# Patient Record
Sex: FEMALE | Race: ASIAN
[De-identification: names, ages, dates, MRNs, and addresses within clinical notes are randomized per-mention and may not be internally consistent; named-entity substitution may affect disease eponyms.]

---

## 2024-05-09 ENCOUNTER — APPOINTMENT (OUTPATIENT)
Dept: PODIATRY | Facility: CLINIC | Age: 44
End: 2024-05-09
Payer: MEDICAID

## 2024-05-09 DIAGNOSIS — Z86.39 PERSONAL HISTORY OF OTHER ENDOCRINE, NUTRITIONAL AND METABOLIC DISEASE: ICD-10-CM

## 2024-05-09 DIAGNOSIS — M79.672 PAIN IN LEFT FOOT: ICD-10-CM

## 2024-05-09 DIAGNOSIS — M72.2 PLANTAR FASCIAL FIBROMATOSIS: ICD-10-CM

## 2024-05-09 DIAGNOSIS — M77.32 CALCANEAL SPUR, LEFT FOOT: ICD-10-CM

## 2024-05-09 DIAGNOSIS — Z82.49 FAMILY HISTORY OF ISCHEMIC HEART DISEASE AND OTHER DISEASES OF THE CIRCULATORY SYSTEM: ICD-10-CM

## 2024-05-09 DIAGNOSIS — Z83.438 FAMILY HISTORY OF OTHER DISORDER OF LIPOPROTEIN METABOLISM AND OTHER LIPIDEMIA: ICD-10-CM

## 2024-05-09 PROBLEM — Z00.00 ENCOUNTER FOR PREVENTIVE HEALTH EXAMINATION: Status: ACTIVE | Noted: 2024-05-09

## 2024-05-09 PROCEDURE — 99203 OFFICE O/P NEW LOW 30 MIN: CPT | Mod: 25

## 2024-05-09 PROCEDURE — 20550 NJX 1 TENDON SHEATH/LIGAMENT: CPT | Mod: LT

## 2024-05-13 PROBLEM — M79.672 LEFT FOOT PAIN: Status: ACTIVE | Noted: 2024-05-10

## 2024-05-13 PROBLEM — M77.32 CALCANEAL SPUR OF LEFT FOOT: Status: ACTIVE | Noted: 2024-05-10

## 2024-05-13 PROBLEM — M72.2 PLANTAR FASCIITIS: Status: ACTIVE | Noted: 2024-05-10

## 2024-05-13 NOTE — ASSESSMENT
[FreeTextEntry1] : Impression: Plantar fasciitis. Heel spur syndrome. Pain.  Treatment: She has post-static dyskinesia and pain consistent with heel spur syndrome. She has been wearing arch supports that help. I recommended much more supportive and stability type sneaker, [employer]oka Bondi-8 which I want her to purchase today. I gave her a hand-out of home therapy exercises and stretching, which I think will be very beneficial. Patient was placed in the treatment chair in supine position. After obtaining verbal consent time out was performed to the identified area of maximal tenderness. The foot was prepped utilizing 70% alcohol solution. After utilizing Ethyl Chloride, I gave a one-time local injection consisting of 1 1/2cc's of a combination of Xylocaine and Kenalog-40 was infiltrated at the site of maximum tenderness over the plantar medial aspect of the left heel with immediate relief noted upon weight bearing. Injection site was covered with band-aid. Patient was advised on the potential of a steroid flare following an injection, advised to apply ice to the area. Patient was instructed on the potential of a fascial rupture, advised to avoid high impact physical activities. I put a strapping on that she will leave on until tomorrow. She will work on the stretching exercises and get new shoes. I gave her a prescription for physical therapy. With continued pain that persists after 6 weeks, she will follow-up in the office.

## 2024-05-13 NOTE — PHYSICAL EXAM
[2+] : left foot dorsalis pedis 2+ [Sensation] : the sensory exam was normal to light touch and pinprick [No Focal Deficits] : no focal deficits [Deep Tendon Reflexes (DTR)] : deep tendon reflexes were 2+ and symmetric [Motor Exam] : the motor exam was normal [Ankle Swelling (On Exam)] : not present [Varicose Veins Of Lower Extremities] : not present [] : not present [Delayed in the Right Toes] : capillary refills normal in right toes [Delayed in the Left Toes] : capillary refills normal in the left toes [FreeTextEntry3] : Hair growth noted on digits. Proximal to distal cooling is within normal limits.  [de-identified] : She has good stability. No pain at the ankle ligaments or rearfoot ligaments. The tendons are noted to be intact without symptomatology. She has medial band insertional plantar fasciitis at the medial insertion. There is no pain in the deep arch. It is mostly at the insertion like a typical plantar fasciitis and heel spur syndrome. Previous x-rays demonstrate heel spur.  Negative Skip's sign and negative signs of DVT. She complains of some numbness but there are no signs of sciatica and no signs of tarsal tunnel with palpation. [FreeTextEntry1] : There is no real swelling noted. The patient has no mottling, discoloration or trophic changes.  [Diminished Throughout Right Foot] : normal sensation with monofilament testing throughout right foot [Diminished Throughout Left Foot] : normal sensation with monofilament testing throughout left foot

## 2024-05-13 NOTE — HISTORY OF PRESENT ILLNESS
[FreeTextEntry1] : Patient presents today because of continued pain at the left foot that has been going on for at least 2 months. She has been rubbing topical medicines on it and she still complains of pain. She has occasional numbness. She complains of post-static dyskinesia and the pain is about a 7/10. She notes only slight improvement recently. She has just been in the CAM boot for the last 3 to 4 weeks but does not feel comfortable in it and does not want to wear it anymore. She has a history of walking and slipping in a puddle and fell, hurting her left ankle and foot 2/29/2024.  X-rays showed no fracture. The pain was localized to the left calcaneus. She does have a previous history of plantar fasciitis. She received ultrasound guided steroid injection 8/11/2023 that gave her long-lasting relief. She obtained an MRI of the left ankle and hindfoot which demonstrates only plantar fasciitis with full thickness partial tear of the central cord measuring 10x3mm. She went for a venous doppler which was reported to be negative. She presents today for evaluation and treatment.

## 2024-05-13 NOTE — CONSULT LETTER
[Dear  ___] : Dear  [unfilled], [Consult Letter:] : I had the pleasure of evaluating your patient, [unfilled]. [Please see my note below.] : Please see my note below. [Consult Closing:] : Thank you very much for allowing me to participate in the care of this patient.  If you have any questions, please do not hesitate to contact me. [Sincerely,] : Sincerely, [FreeTextEntry2] : Miguelito Diaz DPM [phone] [place] [address] Fax: [phone]  [FreeTextEntry3] : Rodríguez Khan DPM